# Patient Record
Sex: FEMALE | Race: OTHER | HISPANIC OR LATINO | Employment: OTHER | URBAN - METROPOLITAN AREA
[De-identification: names, ages, dates, MRNs, and addresses within clinical notes are randomized per-mention and may not be internally consistent; named-entity substitution may affect disease eponyms.]

---

## 2017-10-10 ENCOUNTER — OFFICE VISIT (OUTPATIENT)
Dept: URGENT CARE | Age: 67
End: 2017-10-10
Payer: COMMERCIAL

## 2017-10-10 ENCOUNTER — APPOINTMENT (OUTPATIENT)
Dept: RADIOLOGY | Age: 67
End: 2017-10-10
Attending: PHYSICIAN ASSISTANT
Payer: COMMERCIAL

## 2017-10-10 ENCOUNTER — TRANSCRIBE ORDERS (OUTPATIENT)
Dept: URGENT CARE | Age: 67
End: 2017-10-10

## 2017-10-10 DIAGNOSIS — S69.90XA UNSPECIFIED INJURY OF UNSPECIFIED WRIST, HAND AND FINGER(S), INITIAL ENCOUNTER: ICD-10-CM

## 2017-10-10 PROCEDURE — 73130 X-RAY EXAM OF HAND: CPT

## 2017-10-10 PROCEDURE — 99203 OFFICE O/P NEW LOW 30 MIN: CPT

## 2017-10-13 NOTE — PROGRESS NOTES
Assessment  1  Anxiety (300 00) (F41 9)   2  Contusion of left hand, initial encounter (923 20) (X83 140C)   3  Sprain of left hand, initial encounter (842 10) (K95 [id number])    Plan  Anxiety    · Escitalopram Oxalate 20 MG Oral Tablet; Take 1 tablet daily   · Continue with our present treatment plan ; Status:Complete;   Done: [id number]   · Resume activity to your tolerance ; Status:Complete;   Done: [id number]   · Use warm packs 4 times a day for 15 minutes ; Status:Complete;   Done: [id number]    Discussion/Summary  Understands and agrees with treatment plan: The treatment plan was reviewed with the patient/guardian  The patient/guardian understands and agrees with the treatment plan   Counseling Documentation With Imm: The patient was counseled regarding instructions for management,-prognosis,-patient and family education,-impressions,-risks and benefits of treatment options,-importance of compliance with treatment  Follow Up Instructions: Follow Up with your Primary Care Provider in 3-5 days  If your symptoms worsen, go to the [place] [address] Emergency Department  Chief Complaint  1  Hand Problem  Chief Complaint Free Text Note Form: fell down hurt left hand for 2 weeks  History of Present Illness  HPI: Patient fell after she tripped and landed on her left hand  She has had pain and swelling for the past 2 weeks she has not been seen anywhere yet for this injury  Patient with history of anxiety disorder and takes Lexapro 20 mg once daily  She has her bottles with her  She also takes alprazolam as needed  She does have pills left if needed   Hospital Based Practices Required Assessment:   Pain Assessment   the patient states they have pain  The patient describes the pain as dull  (on a scale of 0 to 10, the patient rates the pain at 7 )   Abuse And Domestic Violence Screen    Yes, the patient is safe at home -The patient states no one is hurting them  Depression And Suicide Screen   No, the patient has not had thoughts of hurting themself  No, the patient has not felt depressed in the past 7 days  Prefered Language is  Mongolian  Hand Problem: Gregory Alvarado presents with complaints of hand problem  Associated symptoms include pain,-swelling,-stiffness-and-weak grasp, but-no warmth,-no fever,-no chills,-no lymphadenopathy,-no numbness,-no tingling,-no redness-and-no itching  Review of Systems  Focused-Female:   Constitutional: as noted in HPI  Musculoskeletal: as noted in HPI  Integumentary: as noted in HPI  Active Problems  1  Anxiety (300 00) (F41 9)   2  Panic (300 01) (F41 0)    Social History   · Never a smoker   · Social alcohol use (Z78 9)  Social History Reviewed: The social history was reviewed and updated today  Surgical History  1  History of Breast Surgery  Surgical History Reviewed: The surgical history was reviewed and updated today  Current Meds   1  ALPRAZolam 0 5 MG Oral Tablet; Therapy: (Recorded:10Oct2017) to Recorded   2  Escitalopram Oxalate 20 MG Oral Tablet; Therapy: (Recorded:10Oct2017) to Recorded  Medication List Reviewed: The medication list was reviewed and updated today  Allergies  1  Sulfa Drugs    Vitals  Signs   Recorded: 01TNC0498 06:51PM   Temperature: 97 4 F, Temporal  Heart Rate: 84  Respiration: 18  Systolic: 562  Diastolic: 94  Height: 5 ft 6 in  Weight: 157 lb   BMI Calculated: 25 34  BSA Calculated: 1 8  O2 Saturation: 99  LMP: n/a  Pain Scale: 7    Physical Exam    Constitutional   General appearance: No acute distress, well appearing and well nourished  Musculoskeletal   Gait and station: Normal  -Left hand soft tissue swelling, and ecchymosis  Neurologic   Sensation: No sensory loss      Psychiatric   Orientation to person, place, and time: Normal     Mood and affect: Normal        Signatures   Electronically signed by : KARTHIKEYAN Subramanian; Oct 10 2017  7:24PM EST                       (Author)    Electronically signed by : Gabriel Red DO; Oct 12 2017  7:07AM EST                       (Co-author)